# Patient Record
Sex: FEMALE | Race: WHITE | Employment: UNEMPLOYED | URBAN - NONMETROPOLITAN AREA
[De-identification: names, ages, dates, MRNs, and addresses within clinical notes are randomized per-mention and may not be internally consistent; named-entity substitution may affect disease eponyms.]

---

## 2020-02-13 ENCOUNTER — OFFICE VISIT (OUTPATIENT)
Dept: FAMILY MEDICINE CLINIC | Age: 30
End: 2020-02-13
Payer: COMMERCIAL

## 2020-02-13 ENCOUNTER — NURSE ONLY (OUTPATIENT)
Dept: LAB | Age: 30
End: 2020-02-13

## 2020-02-13 VITALS
HEIGHT: 61 IN | RESPIRATION RATE: 10 BRPM | WEIGHT: 181.8 LBS | DIASTOLIC BLOOD PRESSURE: 64 MMHG | OXYGEN SATURATION: 98 % | SYSTOLIC BLOOD PRESSURE: 108 MMHG | TEMPERATURE: 98.3 F | HEART RATE: 66 BPM | BODY MASS INDEX: 34.32 KG/M2

## 2020-02-13 PROBLEM — H52.13 MYOPIA WITH ASTIGMATISM, BILATERAL: Status: ACTIVE | Noted: 2019-08-05

## 2020-02-13 PROBLEM — D35.2 PITUITARY ADENOMA (HCC): Status: ACTIVE | Noted: 2019-05-13

## 2020-02-13 PROBLEM — R73.01 ELEVATED FASTING BLOOD SUGAR: Status: ACTIVE | Noted: 2018-12-15

## 2020-02-13 PROBLEM — H52.203 MYOPIA WITH ASTIGMATISM, BILATERAL: Status: ACTIVE | Noted: 2019-08-05

## 2020-02-13 PROBLEM — E66.9 OBESITY, CLASS II, BMI 35-39.9: Status: ACTIVE | Noted: 2019-05-13

## 2020-02-13 PROBLEM — R33.9 RETENTION OF URINE: Status: ACTIVE | Noted: 2020-02-13

## 2020-02-13 PROBLEM — H40.003 PREGLAUCOMA, UNSPECIFIED, BILATERAL: Status: ACTIVE | Noted: 2019-08-05

## 2020-02-13 PROBLEM — N91.1 AMENORRHEA, SECONDARY: Status: ACTIVE | Noted: 2020-02-13

## 2020-02-13 PROBLEM — H35.411 LATTICE DEGENERATION OF PERIPHERAL RETINA, RIGHT: Status: ACTIVE | Noted: 2019-08-05

## 2020-02-13 PROBLEM — N18.1 STAGE 1 CHRONIC KIDNEY DISEASE: Status: ACTIVE | Noted: 2020-02-13

## 2020-02-13 PROBLEM — N64.3 GALACTORRHEA: Status: ACTIVE | Noted: 2019-05-13

## 2020-02-13 LAB
ALBUMIN SERPL-MCNC: 4.1 G/DL (ref 3.5–5.1)
ALP BLD-CCNC: 82 U/L (ref 38–126)
ALT SERPL-CCNC: 13 U/L (ref 11–66)
AMYLASE: 74 U/L (ref 20–104)
ANION GAP SERPL CALCULATED.3IONS-SCNC: 13 MEQ/L (ref 8–16)
AST SERPL-CCNC: 13 U/L (ref 5–40)
AVERAGE GLUCOSE: 111 MG/DL (ref 70–126)
BASOPHILS # BLD: 0.8 %
BASOPHILS ABSOLUTE: 0.1 THOU/MM3 (ref 0–0.1)
BILIRUB SERPL-MCNC: 0.3 MG/DL (ref 0.3–1.2)
BILIRUBIN DIRECT: < 0.2 MG/DL (ref 0–0.3)
BILIRUBIN URINE: NEGATIVE
BLOOD, URINE: NEGATIVE
BUN BLDV-MCNC: 6 MG/DL (ref 7–22)
CALCIUM SERPL-MCNC: 9.6 MG/DL (ref 8.5–10.5)
CARBON MONOXIDE, BLOOD: 6.2 % CO SAT
CHARACTER, URINE: CLEAR
CHLORIDE BLD-SCNC: 105 MEQ/L (ref 98–111)
CHOLESTEROL, TOTAL: 145 MG/DL (ref 100–199)
CO2: 22 MEQ/L (ref 23–33)
COLOR: YELLOW
CREAT SERPL-MCNC: 0.7 MG/DL (ref 0.4–1.2)
EOSINOPHIL # BLD: 2.8 %
EOSINOPHILS ABSOLUTE: 0.2 THOU/MM3 (ref 0–0.4)
ERYTHROCYTE [DISTWIDTH] IN BLOOD BY AUTOMATED COUNT: 16.1 % (ref 11.5–14.5)
ERYTHROCYTE [DISTWIDTH] IN BLOOD BY AUTOMATED COUNT: 52.5 FL (ref 35–45)
ESTRADIOL LEVEL: 249 PG/ML
FOLLICLE STIMULATING HORMONE: 1 MIU/ML (ref 0.6–16.9)
GFR SERPL CREATININE-BSD FRML MDRD: > 90 ML/MIN/1.73M2
GLUCOSE BLD-MCNC: 91 MG/DL (ref 70–108)
GLUCOSE URINE: NEGATIVE MG/DL
HBA1C MFR BLD: 5.7 % (ref 4.4–6.4)
HCT VFR BLD CALC: 38.5 % (ref 37–47)
HDLC SERPL-MCNC: 53 MG/DL
HEMOGLOBIN: 12.2 GM/DL (ref 12–16)
IMMATURE GRANS (ABS): 0.02 THOU/MM3 (ref 0–0.07)
IMMATURE GRANULOCYTES: 0.3 %
IRON: 60 UG/DL (ref 50–170)
KETONES, URINE: NEGATIVE
LDL CHOLESTEROL CALCULATED: 76 MG/DL
LEUKOCYTE ESTERASE, URINE: NEGATIVE
LIPASE: 20.8 U/L (ref 5.6–51.3)
LUTEINIZING HORMONE: 1 MIU/ML (ref 0.6–43.9)
LYMPHOCYTES # BLD: 38.9 %
LYMPHOCYTES ABSOLUTE: 3 THOU/MM3 (ref 1–4.8)
MAGNESIUM: 1.9 MG/DL (ref 1.6–2.4)
MCH RBC QN AUTO: 28.2 PG (ref 26–33)
MCHC RBC AUTO-ENTMCNC: 31.7 GM/DL (ref 32.2–35.5)
MCV RBC AUTO: 89.1 FL (ref 81–99)
MONOCYTES # BLD: 5.3 %
MONOCYTES ABSOLUTE: 0.4 THOU/MM3 (ref 0.4–1.3)
NITRITE, URINE: NEGATIVE
NUCLEATED RED BLOOD CELLS: 0 /100 WBC
PH UA: 6 (ref 5–9)
PLATELET # BLD: 331 THOU/MM3 (ref 130–400)
PMV BLD AUTO: 10.1 FL (ref 9.4–12.4)
POTASSIUM SERPL-SCNC: 3.7 MEQ/L (ref 3.5–5.2)
PROGESTERONE LEVEL: 9.63 NG/ML
PROLACTIN: 1 NG/ML
PROTEIN UA: NEGATIVE
PTH INTACT: 53.9 PG/ML (ref 15–65)
RBC # BLD: 4.32 MILL/MM3 (ref 4.2–5.4)
RHEUMATOID FACTOR: 20 IU/ML (ref 0–13)
SEDIMENTATION RATE, ERYTHROCYTE: 28 MM/HR (ref 0–20)
SEG NEUTROPHILS: 51.9 %
SEGMENTED NEUTROPHILS ABSOLUTE COUNT: 4 THOU/MM3 (ref 1.8–7.7)
SODIUM BLD-SCNC: 140 MEQ/L (ref 135–145)
SPECIFIC GRAVITY, URINE: 1.02 (ref 1–1.03)
T3 TOTAL: 119 NG/DL (ref 72–181)
T4 FREE: 0.88 NG/DL (ref 0.93–1.76)
TOTAL IRON BINDING CAPACITY: 384 UG/DL (ref 171–450)
TOTAL PROTEIN: 8.1 G/DL (ref 6.1–8)
TRIGL SERPL-MCNC: 79 MG/DL (ref 0–199)
TSH SERPL DL<=0.05 MIU/L-ACNC: 1.55 UIU/ML (ref 0.4–4.2)
URIC ACID: 4.7 MG/DL (ref 2.4–5.7)
UROBILINOGEN, URINE: 0.2 EU/DL (ref 0–1)
VITAMIN D 25-HYDROXY: 16 NG/ML (ref 30–100)
WBC # BLD: 7.8 THOU/MM3 (ref 4.8–10.8)

## 2020-02-13 PROCEDURE — 99395 PREV VISIT EST AGE 18-39: CPT | Performed by: FAMILY MEDICINE

## 2020-02-13 PROCEDURE — G8484 FLU IMMUNIZE NO ADMIN: HCPCS | Performed by: FAMILY MEDICINE

## 2020-02-13 RX ORDER — CABERGOLINE 0.5 MG/1
1 TABLET ORAL
COMMUNITY
Start: 2020-02-05

## 2020-02-13 SDOH — ECONOMIC STABILITY: FOOD INSECURITY: WITHIN THE PAST 12 MONTHS, YOU WORRIED THAT YOUR FOOD WOULD RUN OUT BEFORE YOU GOT MONEY TO BUY MORE.: NEVER TRUE

## 2020-02-13 SDOH — ECONOMIC STABILITY: FOOD INSECURITY: WITHIN THE PAST 12 MONTHS, THE FOOD YOU BOUGHT JUST DIDN'T LAST AND YOU DIDN'T HAVE MONEY TO GET MORE.: NEVER TRUE

## 2020-02-13 SDOH — ECONOMIC STABILITY: TRANSPORTATION INSECURITY
IN THE PAST 12 MONTHS, HAS LACK OF TRANSPORTATION KEPT YOU FROM MEETINGS, WORK, OR FROM GETTING THINGS NEEDED FOR DAILY LIVING?: NO

## 2020-02-13 SDOH — ECONOMIC STABILITY: TRANSPORTATION INSECURITY
IN THE PAST 12 MONTHS, HAS THE LACK OF TRANSPORTATION KEPT YOU FROM MEDICAL APPOINTMENTS OR FROM GETTING MEDICATIONS?: NO

## 2020-02-13 SDOH — ECONOMIC STABILITY: INCOME INSECURITY: HOW HARD IS IT FOR YOU TO PAY FOR THE VERY BASICS LIKE FOOD, HOUSING, MEDICAL CARE, AND HEATING?: NOT HARD AT ALL

## 2020-02-13 ASSESSMENT — ENCOUNTER SYMPTOMS
BACK PAIN: 1
SHORTNESS OF BREATH: 1
CONSTIPATION: 1
ABDOMINAL DISTENTION: 1
CHEST TIGHTNESS: 1

## 2020-02-13 ASSESSMENT — PATIENT HEALTH QUESTIONNAIRE - PHQ9
SUM OF ALL RESPONSES TO PHQ QUESTIONS 1-9: 0
2. FEELING DOWN, DEPRESSED OR HOPELESS: 0
1. LITTLE INTEREST OR PLEASURE IN DOING THINGS: 0
SUM OF ALL RESPONSES TO PHQ9 QUESTIONS 1 & 2: 0
SUM OF ALL RESPONSES TO PHQ QUESTIONS 1-9: 0

## 2020-02-13 NOTE — PROGRESS NOTES
[address] [facility] Nestor KAM [address] [address]  Dept: [phone]  Dept Fax: [phone]  Loc: [phone]      Manuel Benitez is a 31 y.o. Black female. Chepe Bashir  presents to the [facility]Res[facility] clinic today for   Chief Complaint   Patient presents with   Rekha Coffman New Doctor     [facility] Protocol- [address]    Other     IMPROVE HEALTH    Tingling     IN FINGER TIPS     Weight Loss     CANT GET SCALES TO MOVE MUCH    Chest Pain     OCCASIONAL CHEST PAIN    Heartburn    Insomnia    Urniary Frequency at Night    Chest Pain     rib pain  OCCAS. SHARP PAINS,    Bleeding/Bruising   ,  and;   1. Amenorrhea, secondary    2. Elevated fasting blood sugar    3. Galactorrhea    4. Lattice degeneration of peripheral retina, right    5. Myopia with astigmatism, bilateral    6. Obesity, Class II, BMI 35-39.9    7. Pituitary adenoma ([address]    8. Preglaucoma, unspecified, bilateral    9. Retention of urine    10. Stage 1 chronic kidney disease    11. Nocturia    12. Sleep difficulties    13. Other intestinal malabsorption    14. Costochondritis    15. Gastroesophageal reflux disease with esophagitis    16. Slow transit constipation      I have reviewed Manuel Benitez medical, surgical and other pertinent history in detail, and have updated medication and allergy information in the computerized patientrecord. Clinical Care Team:     -Referring Provider for today's consult: Self Referred  -Primary Care Provider: No primary care provider on file. Medical/Surgical History:   She  has no past medical history on file. Her  has no past surgical history on file. Family/Social History:     Her family history is not on file. She  reports that she has never smoked. She has never used smokeless tobacco. She reports previous alcohol use.  She reports that she does not use unexpected weight change. HENT: Negative. Eyes: Positive for visual disturbance. Respiratory: Positive for chest tightness and shortness of breath. Cardiovascular: Positive for chest pain. Gastrointestinal: Positive for abdominal distention and constipation. Endocrine: Negative. Genitourinary: Positive for frequency. Negative for menstrual problem. Musculoskeletal: Positive for back pain. Skin: Negative. Allergic/Immunologic: Positive for food allergies. Neurological: Positive for dizziness, light-headedness, numbness and headaches. Hematological: Bruises/bleeds easily. Psychiatric/Behavioral: Positive for decreased concentration and sleep disturbance. All other systems reviewed and are negative. Objective:     /64 (Site: Right Upper Arm, Position: Sitting, Cuff Size: Large Adult)   Pulse 66   Temp 98.3 °F (36.8 °C) (Oral)   Resp 10   Ht 5' 0.5\" (1.537 m)   Wt 181 lb 12.8 oz (82.5 kg)   SpO2 98%   BMI 34.92 kg/m²   Physical Exam  Vitals signs and nursing note reviewed. Constitutional:       Appearance: Normal appearance. HENT:      Head: Normocephalic. Neurological:      Mental Status: She is alert. Psychiatric:         Mood and Affect: Mood normal.         Thought Content: Thought content normal.            Laboratory Data:   Lab results were searched in Care Everywhere and/or those brought by the pateint were reviewed today with [facility], [facility]. AND [facility] and she has a copy of their most recent labs to take home with them as notedbelow;       Imaging Data:   Imaging Data:       Assessment & Plan:       Impression:  1. Amenorrhea, secondary    2. Elevated fasting blood sugar    3. Galactorrhea    4. Lattice degeneration of peripheral retina, right    5. Myopia with astigmatism, bilateral    6. Obesity, Class II, BMI 35-39.9    7. Pituitary adenoma ([address]    8. Preglaucoma, unspecified, bilateral    9. Retention of urine    10. Stage 1 chronic kidney disease    11.  Nocturia 12. Sleep difficulties    13. Other intestinal malabsorption    14. Costochondritis    15. Gastroesophageal reflux disease with esophagitis    16. Slow transit constipation      Assessment and Plan:  After reviewing the patients chief complaints, reviewing their labfindings in great detail (with the patient and those accompanying them) which correlate to their chief complaints, symptoms, and or medical conditions; suggestions were made relating to changes in diet and or supplementswhich may improve the complaints and which will be reflected in their future lab findings; Chief Complaint   Patient presents with   Rekha Coffman New Doctor     [facility] Protocol- [address]    Other     IMPROVE HEALTH    Tingling     IN FINGER TIPS     Weight Loss     CANT GET SCALES TO MOVE MUCH    Chest Pain     OCCASIONAL CHEST PAIN    Heartburn    Insomnia    Urniary Frequency at Night    Chest Pain     rib pain  OCCAS. SHARP PAINS,    Bleeding/Bruising   ;    Plans for the next visits:  - Abnormal and non-optimal Labs were ordered today to be repeated in the next 120-365 days to assess changes from adjustments in nutrition and or nutrients. - Patient instructed when having ablood draw to ask the  to divide their lab draws into multiple draws over several days if not feeling good at the time of the lab draw or if either prefers to do several smaller blood draws over several days  -Patient instructed to check with insurer before each lab draw and to to to the lab which the insurer directs them for the most cost effective lab draw with the least patient's cost  - Chepe Bashir  will be scheduled subsequentto those results. Siobhan Baires will bring in her drink and food log to her next visit    Chronic Problems Addressed on this Visit:                                   1.  Intensity of Service; Uncontrolled items at this visit;   Chief Complaint   Patient presents with   [address] and pharmaceutical grade [facility] #006281 fish oil or triple-strength fish oil, and B-75 two phase time-released B complex by Emmy Alva will be for atime-limited trial to determine their individual effectiveness and safety in this patient. I also referred the patient to the NMCD: Nutrition, Metabolism, and Cardiovascular Diseases (journal) and concerns about long-termuse and hepatotoxicity of cinnamon and other nutrients and suggest they frequently search nih.gov for the latest non-proprietary information on nutriceuticals as well as consider a subscription to [employer] fordetails on reviewed supplements, or at the least review the nutrient files at [address] at [facility], [employer], an insulin mimetic, reduces some High Carbohydrate Dietary Impacts. Methylhydroxychalcone polymers insulin-enhancing properties in fat cells are responsible for enhanced glucose uptake, inhibiting hepatic HMG-CoA reductase and lowers lipids. www.jacn. org/content/20/4/327.full     But cinnamon with additivessuch as Cinnamon Extract are not effective as insulin mimetics.  :eStoreDirectory.at     Nutrients for Start up from [employer] or [employer] for ease to get started now ;  Luis Virk has some useable products;  - Triple Strength Fish Oil, enteric coated  - Vit D 3 5000 IU gel caps  - Iron ferrous sulfat 325 mg tabs  - Centrum Silver look-a-like for most patients, or  - Centrum plain look-a-like if need iron    Localpharmacies or chains such as [employer], [employer], [employer], have;  - Triple Strength Fish Oil (enteric coated ifavailable) or    If not enteric coated, can take from freezer for less burps  - B-50 or B-100 released balanced B complex tabs  - Cinnamon bark 500 mg (without Chromium or extracts)   some brands list 1000 mg / serving of 2 capsules,    some brands have 1000 mg caps with the undesireable chromium / extract  - Calcium carbonate/citrate, magnesium oxide/citrate, Vit D 3  as 3-4 tabs/caps/serving     Some Local Brands may contain Zincwhich is acceptable for the first bottle or two  - Magnesium oxide 250 mg tabs for those having < 2 bowel movements daily  - Magnesium citrate 200 mg if having > 2bowel movement/day  - Centrum Silver or look-a-like for most patients, Centrum plain or look-a-like with iron  - Vitamin D-3 comes as 1,000 IU or 2,000 IU or 5,000 IU gel caps or Liquid drops      Some brands containing or derived from soy oil or corn oil are OK if not allergic to soy  - Elemental Iron 65 mg tabsat bedtime is available over the counter if need more iron     Usually turns bowel movements grey, green or black but not a concern  - Apricot Kernel Oil (by Now) for dry skin sensitive perineal or perianal area skin    Nutrients for ongoing use by Mail order for less expense from [employer] ;  - Strength Fish Oil , 240 Softgels Item #373941  -B-100 time released balanced B complex Item #155458  - Cinnamon bark 500 mg without Chromium or extract Item #849653  - Calcium carbonate 1000 mg, Magnesium oxide 500 mg, Vit D 3  400 IU Item #[id number]  - Magnesium oxide 500 mg tabs Item #878954 if less than 2 bowel movements daily  - [employer] Item #656469 for mostpatients, One Daily Item #399172 with iron  - Vit D 3  1,000 Item #480024      2,000 IU Item #238764  ,000 IU Item #542890     Some brands containing orderived from soy oil or corn oil are OK if not allergic to soy    Nutrients for Special Needs by Shahab Grady for less expense from www. puritan.com ;  -Elemental Iron 65 mg tabs Item #546329 if need more iron for low iron on labs    Usually turns bowel movements grey, green or black but not a concern  - Time released Niacin 250 mg Item #054190 for cold intolerance, low libido or impotence  - DHEA 50 mg Item #165230 for improving DHEA levels on labs if having Fatigue    If stools too loose substitute for your Magnesium oxide using;   Magnesium citrate 200 mg tabs(NOT liquid) at [employer]   Magnesium gluconate 550 mgby Rakesh at [place]. com or amazon. com  Magnesium chloride foot soaks or body sprays  www.[employer])   Magnesium chloride flakes 14.99 Item #: WAQ450 if Backordered get spray    Food Drink Symptom Log;  I asked this patient to track these items and any other symptoms on their list on a weekly basis to documenttheir progress or lack of same. This can be done on the symptom tracking sheet I gave them at today's visit but looks like this:                                                      Rate on scale of 0-10 with zero = notnoticeable  Symptom:                            Week 1               2                 3                 4               Etc            Hair loss    Foot cramps    Paresthesia    Aches    IBS (irritable bowel)    Constipation    Diarrhea  Nocturia    (up to bathroom at night)    Fatigue/Energy level  Stress      On the other side of the sheet they can track their food, drink, environment, activity, symptoms etc      Avoiding Latex-like proteins inmy foods; Avocados, Bananas, Celery, Figs & Kiwi proteins have latex-like proteins to inflame our immunesystems  How Can I Have A Latex Allergy? Eating foods with latex-like protein exposes us to latex allergies. Our body cannot tell the differencebetween these latex-like proteins and latex from rubber products since many people are allergic to fruit, vegetables and latex. Read labels on pre-packaged foods. This list to avoid is only a guide if you are known allergicto latex or have a latex rash on your chin, cheeks and lines on your neck and chest. The amount of latex is different in each food product or fruit variety. to Avoid out of Season if not grown locally: Melon, Nectarine, Papaya, Cherry, Passion fruit, Plum, Chestnuts, and Tomato. Avocado, Banana, Celery, Figs, and Kiwi always contain Latex-like protein. Whats in Season?   Strawberries taste better in June than December because June is strawberry season so buy locally grown produce \"in season\" for the best flavor, cost and less Latex. Locally grown produce notonly tastes great requires little of no ethylene exposure in food distribution so has less latex content. Out of season, use canned, frozen or dried sinceprocessed ripe and are latex lower!!!   Month     [place] Loc[employer]rown Produ[employer]  January, February, March: use canned, frozen or dried fruits since lower in latex  April; asparagus, radishes  May; asparagus, broccoli, green onions, greens, peas, radishes,rhubarb  June; asparagus, beets, beans, broccoli, cabbage, cantaloupe, carrots, green onions, greens, lettuce,onions, parsley, peas, radishes, rhubarb, strawberries, watermelons  July; beans, beets, blueberries,broccoli, cabbage, cantaloupe, carrots, cauliflower, celery, cucumbers, eggplant, grapes, green onions, greens, lettuce, onions, parsley, peas, peaches, bell peppers, potatoes, radishes, summer raspberries, squash, sweetcorn, tomatoes, turnips, watermelons  August; apples, beans, beets, blueberries, cabbage, cantaloupe, carrots,cauliflower, celery, cucumbers, eggplant, grapes, green onions, greens, lettuce, onions, parsley, peas, peaches, pears, bell peppers, potatoes, radishes, squash, sweet corn, tomatoes, turnips, watermelons  September; apples, beans, beets, blueberries, cabbage, cantaloupe, carrots, cauliflower, celery, cucumbers, eggplant, grapes,green onions, greens, lettuce, onions, parsley, peas, peaches, pears, bell peppers, plums, potatoes, pumpkins, radishes, fall red raspberries, squash, sweet corn, tomatoes, turnips, watermelons  October; apples, beets, broccoli, cabbage, carrots, cauliflower, celery, green onions, greens, lettuce, parsley, peas, pears, potatoes,pumpkins, radishes, fall red raspberries, squash, turnips  November; broccoli, cabbage, carrots, parsley,pears, peas  December: use canned, frozen ordried fruits since lower in

## 2020-02-13 NOTE — PATIENT INSTRUCTIONS
Thank you   1. Thank you for trusting us with your healthcare needs. You may receive a survey regarding today's visit. It would help us out if you would take a few moments to provide your feedback. We value your input. 2. Please bring in ALL medications BOTTLES, including inhalers, herbal supplements, over the counter, prescribed & non-prescribed medicine. The office would like actual medication bottles and a list.   3. Please note our OFFICE POLICIES:   a. Prior to getting your labs drawn, please check with your insurance company for benefits and eligibility of lab services. Often, insurance companies cover certain tests for preventative visits only. It is patient's responsibility to see what is covered. b. We are unable to change a diagnosis after the test has been performed. c. Lab orders will not be re-printed. Please hold onto your original lab orders and take them to your lab to be completed. d. If you no show your scheduled appointment three times, you will be dismissed from this practice. e. Jasvir Liam must be completed 24 hours prior to your schedule appointment. 4. If the list below has been completed, PLEASE FAX RECORDS TO OUR OFFICE @ [phone].  Once the records have been received we will update your records at our office:  Health Maintenance Due   Topic Date Due    Varicella vaccine (1 of 2 - 2-dose childhood series) 02/06/1990    HIV screen  02/06/2004    Cervical cancer screen  02/06/2010    Flu vaccine (1) 09/01/2019

## 2020-02-14 ENCOUNTER — TELEPHONE (OUTPATIENT)
Dept: FAMILY MEDICINE CLINIC | Age: 30
End: 2020-02-14

## 2020-02-14 LAB
C-REACTIVE PROTEIN, HIGH SENSITIVITY: 8.9 MG/L
GLIADIN PEPTIDE IGG: 1.3 U/ML
HOMOCYSTEINE: 5.9 UMOL/L
T3 FREE: 3.39 PG/ML (ref 2.02–4.43)
THYROID PEROXIDASE ANTIBODY: 12.6 IU/ML (ref 0–35)
THYROXINE (T4): 6 UG/DL (ref 4.5–12)

## 2020-02-14 NOTE — TELEPHONE ENCOUNTER
Called to advise patient of abnormal value on the carboxyhemoglobin. Lm asking for a call back due to abnormal value on a lab result. If she wants she can check her my chart for a note from Dr. Linda Lindquist. Need to let her know we are sending a lab order for the daughter to get her level checked too.

## 2020-02-15 LAB
ANA SCREEN: NORMAL
DHEAS (DHEA SULFATE): 74 UG/DL (ref 45–270)
THYROGLOBULIN: NORMAL

## 2020-02-16 LAB
DHEA UNCONJUGATED: 2.04 NG/ML (ref 1.33–7.78)
TESTOSTERONE, FREE W SHGB, FEMALES/CHILDREN: NORMAL